# Patient Record
Sex: MALE | Race: OTHER | NOT HISPANIC OR LATINO
[De-identification: names, ages, dates, MRNs, and addresses within clinical notes are randomized per-mention and may not be internally consistent; named-entity substitution may affect disease eponyms.]

---

## 2022-03-09 ENCOUNTER — NON-APPOINTMENT (OUTPATIENT)
Age: 57
End: 2022-03-09

## 2022-03-17 PROBLEM — Z00.00 ENCOUNTER FOR PREVENTIVE HEALTH EXAMINATION: Status: ACTIVE | Noted: 2022-03-17

## 2022-03-28 ENCOUNTER — OUTPATIENT (OUTPATIENT)
Dept: OUTPATIENT SERVICES | Facility: HOSPITAL | Age: 57
LOS: 1 days | End: 2022-03-28
Payer: COMMERCIAL

## 2022-03-28 ENCOUNTER — RESULT REVIEW (OUTPATIENT)
Age: 57
End: 2022-03-28

## 2022-03-28 ENCOUNTER — APPOINTMENT (OUTPATIENT)
Dept: ORTHOPEDIC SURGERY | Facility: CLINIC | Age: 57
End: 2022-03-28
Payer: MEDICAID

## 2022-03-28 ENCOUNTER — APPOINTMENT (OUTPATIENT)
Dept: SURGERY | Facility: CLINIC | Age: 57
End: 2022-03-28
Payer: MEDICAID

## 2022-03-28 VITALS
HEIGHT: 74 IN | DIASTOLIC BLOOD PRESSURE: 78 MMHG | TEMPERATURE: 97.6 F | OXYGEN SATURATION: 99 % | WEIGHT: 222 LBS | BODY MASS INDEX: 28.49 KG/M2 | SYSTOLIC BLOOD PRESSURE: 133 MMHG | HEART RATE: 62 BPM

## 2022-03-28 VITALS
OXYGEN SATURATION: 97 % | SYSTOLIC BLOOD PRESSURE: 126 MMHG | BODY MASS INDEX: 28.88 KG/M2 | WEIGHT: 225 LBS | TEMPERATURE: 98 F | HEIGHT: 74 IN | DIASTOLIC BLOOD PRESSURE: 83 MMHG | HEART RATE: 73 BPM

## 2022-03-28 DIAGNOSIS — Z78.9 OTHER SPECIFIED HEALTH STATUS: ICD-10-CM

## 2022-03-28 DIAGNOSIS — Z72.3 LACK OF PHYSICAL EXERCISE: ICD-10-CM

## 2022-03-28 DIAGNOSIS — D17.1 BENIGN LIPOMATOUS NEOPLASM OF SKIN AND SUBCUTANEOUS TISSUE OF TRUNK: ICD-10-CM

## 2022-03-28 DIAGNOSIS — M25.569 PAIN IN UNSPECIFIED KNEE: ICD-10-CM

## 2022-03-28 DIAGNOSIS — Z87.39 PERSONAL HISTORY OF OTHER DISEASES OF THE MUSCULOSKELETAL SYSTEM AND CONNECTIVE TISSUE: ICD-10-CM

## 2022-03-28 DIAGNOSIS — R22.42 LOCALIZED SWELLING, MASS AND LUMP, LEFT LOWER LIMB: ICD-10-CM

## 2022-03-28 DIAGNOSIS — D17.24 BENIGN LIPOMATOUS NEOPLASM OF SKIN AND SUBCUTANEOUS TISSUE OF LEFT LEG: ICD-10-CM

## 2022-03-28 DIAGNOSIS — Z60.2 PROBLEMS RELATED TO LIVING ALONE: ICD-10-CM

## 2022-03-28 DIAGNOSIS — M79.89 OTHER SPECIFIED SOFT TISSUE DISORDERS: ICD-10-CM

## 2022-03-28 DIAGNOSIS — M17.0 BILATERAL PRIMARY OSTEOARTHRITIS OF KNEE: ICD-10-CM

## 2022-03-28 DIAGNOSIS — R22.30 LOCALIZED SWELLING, MASS AND LUMP, UNSPECIFIED UPPER LIMB: ICD-10-CM

## 2022-03-28 PROCEDURE — 72170 X-RAY EXAM OF PELVIS: CPT

## 2022-03-28 PROCEDURE — 99205 OFFICE O/P NEW HI 60 MIN: CPT

## 2022-03-28 PROCEDURE — 99203 OFFICE O/P NEW LOW 30 MIN: CPT

## 2022-03-28 PROCEDURE — 73564 X-RAY EXAM KNEE 4 OR MORE: CPT

## 2022-03-28 PROCEDURE — 73564 X-RAY EXAM KNEE 4 OR MORE: CPT | Mod: 26,50

## 2022-03-28 PROCEDURE — 72170 X-RAY EXAM OF PELVIS: CPT | Mod: 26

## 2022-03-28 RX ORDER — CELECOXIB 200 MG/1
200 CAPSULE ORAL TWICE DAILY
Qty: 60 | Refills: 2 | Status: ACTIVE | COMMUNITY
Start: 2022-03-28 | End: 1900-01-01

## 2022-03-28 RX ORDER — MELOXICAM 15 MG/1
15 TABLET ORAL
Refills: 0 | Status: ACTIVE | COMMUNITY

## 2022-03-28 RX ORDER — MELOXICAM 15 MG/1
TABLET ORAL
Refills: 0 | Status: ACTIVE | COMMUNITY

## 2022-03-28 SDOH — SOCIAL STABILITY - SOCIAL INSECURITY: PROBLEMS RELATED TO LIVING ALONE: Z60.2

## 2022-03-28 NOTE — DISCUSSION/SUMMARY
[de-identified] : 55y/o male with L > R knee osteoarthritis\par - He is indicated for L TKA\par - We discussed the details of the procedure, the expected recovery period, and the expected outcome. We discussed the likelihood of satisfaction after complete recovery, and the potential causes of dissatisfaction. The importance of active patient participation in the rehabilitation protocol was emphasized, along with its influence on short and long-term outcomes. Specific risks of total knee replacement were discussed in detail. We discussed the risk of surgical site complications including but not limited to: surgical site infection, wound healing complications, bone fracture, tendon or ligament injury, neurovascular injury, hemorrhage, postoperative stiffness or instability, persistent pain and need for reoperation or manipulation under anesthesia. We discussed surgical blood loss and the possible need for blood transfusion. We discussed the risk of perioperative medical complications, including but not limited to catheter-associated urinary tract infection, venous thromboembolism and other cardiopulmonary complications. We discussed anesthetic options and the risk of anesthesia-related complications. We discussed implant fixation methods; my plan would be to use cementless tibial and femoral fixation in this case. We discussed the variable need to resurface the patella; my plan would be to resurface with a cemented component in this case. We discussed the durability of prosthetic knees and limitations related to wear, osteolysis and loosening.  We discussed the role of the robot in helping to guide bone resections and measure alignment and soft tissue balance. All questions were answered the patient's satisfaction. I asked the patient to either call back or schedule a followup appointment for any additional questions or concerns regarding the procedure.\par - After full discussion, he is not willing to consider surgery at this time\par - PT\par - HEP\par - Tylenol + celecoxib as needed\par - CSI was ineffective in the past; would not repeat now\par - Discussed HA; his insurance will not cover; he is not interested right now\par - Discussed possible participation in [employer] study; he is not interested\par - Refer to Pain Mgmt for opioids per his request\par - Follow up as needed

## 2022-03-28 NOTE — REVIEW OF SYSTEMS
[Arthralgia] : arthralgia [Joint Pain] : joint pain [Joint Stiffness] : joint stiffness [Joint Swelling] : joint swelling [Recent Weight Gain (___ Lbs)] : recent ~M [unfilled] lbs weight gain [Sleep Disturbances] : ~T sleep disturbances [Feeling Weak] : feeling weak [Negative] : Heme/Lymph

## 2022-03-28 NOTE — HISTORY OF PRESENT ILLNESS
[de-identified] : 3/28/22: 57y/o male presenting for an initial consultation for right knee pain that has been ongoing approximately for the past ten years progressively getting worse during the past few months. Treatments have included PT, HEP, activity modification, Tylenol, meloxicam, Aleve, multiple CSI, and 2x arthroscopies approximately 15 and 10 years ago. Current ambulatory tolerance is about 2-3 blocks without assistive device. 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 He is finding his job more difficult to do and uses a back brace during the day for concurrent low back pain. He has a history of lumbar spinal stenosis. He reports worsening right knee stiffness in both flexion and extension. He has difficulty with stairs. He is requesting opioid pain medications. [Worsening] : worsening [9] : a current pain level of 9/10 [Bending] : worsened by bending [Direct Pressure] : worsened by direct pressure [Walking] : worsened by walking [Knee Flexion] : worsened with knee flexion [Acetaminophen] : relieved by acetaminophen [NSAIDs] : relieved by nonsteroidal anti-inflammatory drugs [de-identified] : dull/sharp

## 2022-03-28 NOTE — PHYSICAL EXAM
[de-identified] : General appearance: well nourished and hydrated, pleasant, alert and oriented x 3, cooperative.  \par HEENT: normocephalic, EOM intact, wearing mask, external auditory canal clear.  \par Cardiovascular: no lower leg edema, no varicosities, dorsalis pedis pulses palpable and symmetric.  \par Lymphatics: no palpable lymphadenopathy, no lymphedema.  \par Neurologic: sensation is normal, no muscle weakness in upper or lower extremities, patella tendon reflexes present and symmetric.  \par Dermatologic: skin moist, warm, no rash.  \par Spine: cervical spine with normal lordosis and painless range of motion, thoracic spine with normal kyphosis and painless range of motion, lumbosacral spine with normal lordosis and stiff uncomfortable range of motion. \par Gait: right antalgia, no assistive device.\par \par Left knee: all fields negative/normal/unremarkable unless otherwise noted -- \par - Focal soft tissue swelling: moderate suprapatellar\par - Ecchymosis: \par - Erythema: \par - Effusion: small\par - Wounds: healed arthroscopic portals\par - Alignment: fixed varus\par - Tenderness: medial > lateral joint line\par - ROM: 10-80\par - Collateral laxity: \par - Cruciate laxity:\par - Popliteal angle (degrees): 50\par - Quad strength: \par \par Right knee: all fields negative/normal/unremarkable unless otherwise noted --\par - Focal soft tissue swelling: \par - Ecchymosis: \par - Erythema: \par - Effusion: small\par - Wounds: \par - Alignment: \par - Tenderness: peripatellar and posterior\par - ROM: 3-125\par - Collateral laxity: \par - Cruciate laxity: \par - Popliteal angle (degrees): 50\par - Quad strength:  [de-identified] : AP pelvis and 4 views of the bilateral knees (weightbearing AP, weightbearing Carbajal, weightbearing lateral, and Sunrise) were interpreted by me and reviewed with the patient.\par \par Location of imaging: [facility]\par Date of exam: 3/28/22\par \par Pelvic alignment: oblique with right side up\par \par Right hip -- \par Arthritis: minimal\par Deformity: none\par Osteonecrosis: none\par \par Left hip -- \par Arthritis: none\par Deformity: none\par Osteonecrosis: none\par \par Right knee -- \par Alignment: normal\par Arthritis: tricompartmental, KL2\par Patellar height: normal\par Patellar tracking: central\par \par Left knee -- \par Alignment: varus\par Arthritis: tricompartmental worst medial, KL4\par Patellar height: borderline baja\par Patellar tracking: central

## 2022-04-01 ENCOUNTER — APPOINTMENT (OUTPATIENT)
Dept: ULTRASOUND IMAGING | Facility: HOSPITAL | Age: 57
End: 2022-04-01

## 2022-04-13 NOTE — ASSESSMENT
[FreeTextEntry1] : 55 y/o male with a neck, Right scapula/back and left lateral calf mass. Discussed plan for ultrasound the right scapula mass for further evaluation of mass. Discussed plan for ultrasound of the left lateral calf mass as clinically difficult to feel a palpable mass. Will call pt with results. He will return to office for further discussion of possible removal of masses. All questions answered. Notably greater than 50% of  today's 30 minute visit was spent on counseling and coordination of patients care.

## 2022-04-13 NOTE — HISTORY OF PRESENT ILLNESS
[de-identified] : This is a 57 y/o male presenting to the office for evaluation and management of a neck mass, shoulder mass and leg mass. Reports the neck mass has been present for 5 years, increasing in size, denies any pain or discomfort. Reports the right scapula/shoulder mass has been present for 10 years, increasing in size, no discomfort or pain. Reports the left lower extremity mass first noticed a couple of months ago, increased in size. Reports some discomfort over this mass. Denies any drainage. Has not had any imaging.

## 2022-04-13 NOTE — PHYSICAL EXAM
[Oriented to Person] : oriented to person [Oriented to Place] : oriented to place [Oriented to Time] : oriented to time [Calm] : calm [Abdominal Masses] : No abdominal masses [Abdomen Tenderness] : ~T ~M No abdominal tenderness [Tender] : was nontender [Enlarged] : not enlarged [de-identified] : NAD, comfortable [de-identified] : Normocephalic, atraumatic. No scleral icterus.  [de-identified] : Supple, no JVD or cervical lymphadenopathy.  [de-identified] : No respiratory distress.  [de-identified] : +BS soft, nontender, nondistended.  [de-identified] : p [de-identified] : Warm, dry. There is a 4x4cm mass on the posterior neck, nontender, mobile. Punctum noted. No drainage. There is a 8cm mass on the right scapula/back, mobile, nontender. No drainage. There is a raised area on the left lateral calf, poorly marginalized mass. There is a 4-6cm area of tenderness.